# Patient Record
(demographics unavailable — no encounter records)

---

## 2018-08-05 NOTE — EMERGENCY ROOM REPORT
History of Present Illness


General


Chief Complaint:  Abdominal Pain


Source:  Patient





Present Illness


HPI


This patient c/o all day left flank/llq pain. Sharp, stabbing. Fairly severe. 

Abrupt onset. No similar history. No dysuria, urgency, frequency. No vag d/c. 

No other abd pain, no cp. Mild nausea, no vomiting.


Allergies:  


Coded Allergies:  


     No Known Allergies (Unverified , 8/5/18)





Patient History


Last Menstrual Period:  JULY 27,2018





Nursing Documentation-[facility]


Past Medical History:  No Stated History





Review of Systems


Constitutional:  Reports: no symptoms, see HPI


Eye:  Reports: no symptoms


ENT:  Reports: no symptoms


Respiratory:  Reports: no symptoms


Cardiovascular:  Reports: no symptoms


Gastrointestinal:  Reports: no symptoms


Genitourinary:  Reports: no symptoms


Musculoskeletal:  Reports: back pain


Skin:  Reports: no symptoms


Psychiatric:  Reports: no symptoms


Neurological:  Reports: no symptoms


Endocrine:  Reports: no symptoms


Hematologic/Lymphatic:  Reports: no symptoms


Allergic:  Reports: no symptoms





Physical Exam





Vital Signs








  Date Time  Temp Pulse Resp B/P (MAP) Pulse Ox O2 Delivery O2 Flow Rate FiO2


 


8/5/18 02:12 98.2 98 18 118/80 100 Room Air  





 98.2       








Sp02 EP Interpretation:  reviewed, normal


General Appearance:  normal inspection, well appearing, no apparent distress, 

alert, GCS 15, non-toxic


Head:  normocephalic, atraumatic


Eyes:  bilateral eye normal inspection, bilateral eye PERRL, bilateral eye EOMI


ENT:  normal ENT inspection, hearing grossly normal, normal pharynx, no 

angioedema, normal voice, moist mucus membranes


Neck:  normal inspection, full range of motion, supple, no meningismus, no bony 

tend


Respiratory:  normal inspection, lungs clear, normal breath sounds, no rhonchi, 

no respiratory distress, no retraction, no accessory muscle use, no wheezing


Cardiovascular #1:  normal inspection, regular rate, rhythm, no edema


Gastrointestinal:  normal inspection, normal bowel sounds, non tender, soft, no 

mass, non-distended


Genitourinary:  CVA tenderness (L)


Musculoskeletal:  gait/station normal, normal range of motion


Neurologic:  normal inspection, alert, oriented x3, responsive, motor strength/

tone normal


Psychiatric:  normal inspection, judgement/insight normal, memory normal


Suicide Risk Assessment:  


   Suicidal Ideation:  No


   Had intent to initiate attempt:  No


   Pt's plan for suicide attempt:  No


   Has means to complete attempt:  No


Skin:  normal inspection, normal color, no rash, warm/dry





Medical Decision Making


Diagnostic Impression:  


 Primary Impression:  


 Renal colic on left side


ER Course


hx and physical entirely consistent with left renal colic


good relief with iv toradol


advised f/u pmd this week for renal us





Last Vital Signs








  Date Time  Temp Pulse Resp B/P (MAP) Pulse Ox O2 Delivery O2 Flow Rate FiO2


 


8/5/18 02:12 98.2 98 18 118/80 100 Room Air  





 98.2       








Status:  improved


Disposition:  HOME, SELF-CARE


Referrals:  


NOT CHOSEN IPA/MD,REFERRING (PCP)











Jr Barba M.D. Aug 5, 2018 04:17